# Patient Record
(demographics unavailable — no encounter records)

---

## 2019-01-10 NOTE — CARD
--------------- APPROVED REPORT --------------





Date of service: 01/10/2019



EKG Measurement

Heart [id number]

HI [id number]

[id number]

[id number]

[id number]



<Conclusion>

Normal sinus rhythm

Nonspecific T wave abnormality

Prolonged QT

Abnormal ECG

## 2019-01-10 NOTE — ED PDOC
HPI: Psych/Substance Abuse


Time Seen by Provider: 01/10/19 08:10


Chief Complaint (Nursing): Psychiatric Evaluation


Chief Complaint (Provider): they changed my medications


History Per: Patient


History/Exam Limitations: no limitations


Current Symptoms Are (Timing): Still Present


Modifying Factor(s): None


Severity: Mild


Associated Symptoms: Depression.  denies: Anger, Anxiety, Agitation, Paranoia, 

Suicidal Thoughts, Suicidal Plan


Additional Complaint(s): 





64yo female hx depression recently taken off medication and started on respirdal

first dose yesterday, she didnt "feel right" after taking one dose so she took 

additional dose, gave her slight headache and anxiety. Denies suicidal thoughts,

etoh use or sleep disturbance. Also takes paroxetine, ambien for sleep and 

clonazepam prn.





psychiatrist Dr Wade





Past Medical History


Reviewed: Historical Data, Nursing Documentation, Vital Signs


Vital Signs: 





                                Last Vital Signs











Temp  97.6 F   01/10/19 07:44


 


Pulse  100 H  01/10/19 07:44


 


Resp  18   01/10/19 07:44


 


BP  206/104 H  01/10/19 07:44


 


Pulse Ox  98   01/10/19 07:54














- Medical History


PMH: Anxiety





- Social History


Current smoker - smoking cessation education provided: No


Alcohol: None





- Allergies


Allergies/Adverse Reactions: 


                                    Allergies











Allergy/AdvReac Type Severity Reaction Status Date / Time


 


No Known Allergies Allergy   Verified 01/10/19 07:54














Review of Systems


Constitutional: Negative for: Fever


Cardiovascular: Negative for: Chest Pain


Gastrointestinal: Negative for: Nausea, Abdominal Pain


Genitourinary Female: Negative for: Dysuria


Musculoskeletal: Negative for: Neck Pain


Skin: Negative for: Rash, Lesions


Neurological: Negative for: Weakness, Numbness


Psych: Positive for: Anxiety, Depression.  Negative for: Suicidal ideation, 

Withdrawal





Physical Exam





- Reviewed


Nursing Documentation Reviewed: Yes


Vital Signs Reviewed: Yes





- Physical Exam


Appears: Positive for: Well, Non-toxic, No Acute Distress


Head Exam: Positive for: ATRAUMATIC, NORMAL INSPECTION, NORMOCEPHALIC


Skin: Positive for: Normal Color, Warm, DRY


Eye Exam: Positive for: EOMI, Normal appearance, PERRL


ENT: Positive for: Normal ENT Inspection


Neck: Positive for: Normal, Painless ROM


Cardiovascular/Chest: Positive for: Regular Rate, Rhythm


Respiratory: Positive for: CNT, Normal Breath Sounds


Gastrointestinal/Abdominal: Positive for: Normal Exam, Soft


Back: Positive for: Normal Inspection


Extremity: Positive for: Normal ROM


Neurologic/Psych: Positive for: Alert, Oriented, Mood/Affect (fair insight, 

cooperative, pleasant mood)





- ECG


ECG: Positive for: Interpreted By Me


ECG Rhythm: Positive for: Sinus Rhythm, Nonspecific Changes


Interpretation Of Abn EKG: QTc 483


Rate: 87


O2 Sat by Pulse Oximetry: 98


Pulse Ox Interpretation: Normal





Medical Decision Making


Medical Decision Making: 





discuss medications w crisis and they recommended via Dr Jo for discharge and 

followup w her private psychiatrist





Disposition





- Clinical Impression


Clinical Impression: 


 Depression








- Disposition


Referrals: 


Shivam Baeza MD [Medical Doctor] - 


Additional Instructions: 


Followup with your psychiatrist as directed. Return to ER for any worsening 

symptoms, thoughts of self-harm or any concern





Instructions:  Depression


Forms:  [facility] (English)